# Patient Record
Sex: MALE | Race: WHITE | NOT HISPANIC OR LATINO | URBAN - METROPOLITAN AREA
[De-identification: names, ages, dates, MRNs, and addresses within clinical notes are randomized per-mention and may not be internally consistent; named-entity substitution may affect disease eponyms.]

---

## 2021-12-08 ENCOUNTER — EMERGENCY (EMERGENCY)
Facility: HOSPITAL | Age: 36
LOS: 0 days | Discharge: HOME | End: 2021-12-08
Attending: EMERGENCY MEDICINE | Admitting: EMERGENCY MEDICINE
Payer: MEDICAID

## 2021-12-08 VITALS
WEIGHT: 164.91 LBS | OXYGEN SATURATION: 100 % | SYSTOLIC BLOOD PRESSURE: 141 MMHG | DIASTOLIC BLOOD PRESSURE: 59 MMHG | TEMPERATURE: 99 F | RESPIRATION RATE: 18 BRPM | HEART RATE: 89 BPM

## 2021-12-08 DIAGNOSIS — L73.1 PSEUDOFOLLICULITIS BARBAE: ICD-10-CM

## 2021-12-08 DIAGNOSIS — F17.200 NICOTINE DEPENDENCE, UNSPECIFIED, UNCOMPLICATED: ICD-10-CM

## 2021-12-08 PROCEDURE — 99282 EMERGENCY DEPT VISIT SF MDM: CPT

## 2021-12-08 NOTE — ED PROVIDER NOTE - OBJECTIVE STATEMENT
37 yo M, no known hx here for assessment of red, swollen spot to pubic area -- notes he has had this previous and "popped it" but was concerned that he had herpes or genital warts so came to ED.    No fever, chills, pain, dysuria, discharge, other genital lesions.    No new recent sexual partners.

## 2021-12-08 NOTE — ED PROVIDER NOTE - NSFOLLOWUPINSTRUCTIONS_ED_ALL_ED_FT
Use warm compresses to the area and apply over the counter bacitracin. Try not to "pop" the small pimple over the area as it can cause worsening infection.    If you develop fever, worsening pain, redness, swelling or any other concerning conditions, please return to the ER immediately.       Ingrown Hair       An ingrown hair is a hair that curls and re-enters the skin instead of growing straight out of the skin. An ingrown hair can develop in any part of the skin that hair is removed from. An ingrown hair may cause small pockets of infection.      What are the causes?  An ingrown hair may be caused by:  •Shaving.      •Tweezing.      •Waxing.      •Using a hair removal cream.        What increases the risk?    You are more likely to develop this condition if you have curly hair.      What are the signs or symptoms?  Symptoms of an ingrown hair may include:  •Small bumps on the skin. The bumps may be filled with pus.      •Pain.      •Itching.        How is this diagnosed?    An ingrown hair is diagnosed by a skin exam.      How is this treated?  Treatment is often not needed unless the ingrown hair has caused an infection. If needed, treatment may include:  •Applying prescription creams to the skin. This can help with inflammation.      •Applying warm compresses to the skin. This can help soften the skin.      •Taking antibiotic medicine. An antibiotic may be prescribed if the infection is severe.      •Retracting and releasing the ingrown hair tips.        Follow these instructions at home:     Medicines     •Take, apply, or use over-the-counter and prescription medicines only as told by your health care provider. This includes any prescription creams.      •If you were prescribed an antibiotic medicine, take it as told by your health care provider. Do not stop using the antibiotic even if you start to feel better.      General instructions     • Do not shave irritated areas of skin. You may start shaving these areas again once the irritation has gone away.      •To help remove ingrown hairs on your face, you may use a facial sponge in a gentle circular motion.      • Do not pick or squeeze the irritated area of skin as this may cause infection and scarring.      Managing pain and swelling   •If directed, apply heat to the affected area as often as told by your health care provider. Use the heat source that your health care provider recommends, such as a moist heat pack or a heating pad.  •Place a towel between your skin and the heat source.      •Leave the heat on for 20–30 minutes.      •Remove the heat if your skin turns bright red. This is especially important if you are unable to feel pain, heat, or cold. You may have a greater risk of getting burned.          How is this prevented?    •Shower before shaving.      •Wrap areas that you are going to shave in warm, moist wraps for several minutes before shaving. The warmth and moisture help to soften the hairs and makes ingrown hairs less likely.      •Use thick shaving gels.      •Use a razor that cuts hair slightly above your skin, or use an electric shaver with a long shave setting.      •Shave in the direction of hair growth.      •Avoid making multiple razor strokes.      •Apply a moisturizing lotion after shaving.        Summary    •An ingrown hair is a hair that curls and re-enters the skin instead of growing straight out of the skin.      •Treatment is often not needed unless the ingrown hair has caused an infection.      •Take, apply, or use over-the-counter and prescription medicines only as told by your health care provider. This includes any prescription creams.      • Do not shave irritated areas of skin. You may start shaving these areas again once the irritation has gone away.      •If directed, apply heat to the affected area. Use the heat source that your health care provider recommends, such as a moist heat pack or a heating pad.      This information is not intended to replace advice given to you by your health care provider. Make sure you discuss any questions you have with your health care provider.

## 2021-12-08 NOTE — ED PROVIDER NOTE - PATIENT PORTAL LINK FT
You can access the FollowMyHealth Patient Portal offered by [facility] by registering at the following website: [email or web address]. By joining [employer]’s FollowMyHealth portal, you will also be able to view your health information using other applications (apps) compatible with our system.

## 2021-12-08 NOTE — ED ADULT NURSE NOTE - OBJECTIVE STATEMENT
36 year old male complaining of lump on right side of groin. Pt describes the lump as hard and moveable. Pt denies pain but states the lump is getting bigger. Pt states he noticed the lump about a week ago. Pt denies fever, urinary symptoms, penile discharge. Pt is unsure if STD.

## 2021-12-08 NOTE — ED PROVIDER NOTE - NS ED ROS FT
Constitutional: see HPI  Cardiac: No chest pain, SOB or edema.  Respiratory: No cough or respiratory distress  GI: No nausea, vomiting, diarrhea or abdominal pain.  : see HPI  MS: no pain to back or extremities, no loss of ROM, no weakness  Neuro: No headache or weakness. No LOC.  Skin: see HPU  Except as documented in the HPI, all other systems are negative.

## 2021-12-08 NOTE — ED PROVIDER NOTE - PHYSICAL EXAMINATION
VITAL SIGNS: I have reviewed nursing notes and confirm.  CONSTITUTIONAL: Well-developed; well-nourished; in no acute distress.  SKIN: single .5cm in diameter papule to pubic are, no fluctuance, no purulence, not herpetic in nature, slightly red and warm, non tender.   HEAD: Normocephalic; atraumatic.  EYES: PERRL, EOM intact; conjunctiva and sclera clear.  ENT: No nasal discharge; airway clear. TMs clear.  CARD: RRR, no murmur  RESP: No wheezes, rales or rhonchi.  ABD: Normal bowel sounds; soft; non-distended; non-tender  : aside from as noted in skin, no lesions, no discharge, no inguinal adenopathy.   EXT: Normal ROM.   NEURO: Alert, oriented. Grossly unremarkable. No focal deficits.  PSYCH: Cooperative, appropriate.

## 2021-12-08 NOTE — ED ADULT TRIAGE NOTE - CHIEF COMPLAINT QUOTE
pt c/o "lump in right groin and growing" describes it as hard and moveable ,denies pain. denies dysuria. wants as STD check.

## 2021-12-08 NOTE — ED PROVIDER NOTE - CLINICAL SUMMARY MEDICAL DECISION MAKING FREE TEXT BOX
Exam consistent with ingrown hair -- no signs of abscess, cellulitis. No signs of HSV, genital warts,  GC/chlamydia.     Advised on warm soaks, bacitracin, follow up.

## 2021-12-29 ENCOUNTER — EMERGENCY (EMERGENCY)
Facility: HOSPITAL | Age: 36
LOS: 1 days | End: 2021-12-29
Attending: EMERGENCY MEDICINE
Payer: MEDICAID

## 2021-12-29 PROCEDURE — L9981: CPT

## 2025-08-27 ENCOUNTER — OUTPATIENT (OUTPATIENT)
Dept: OUTPATIENT SERVICES | Facility: HOSPITAL | Age: 40
LOS: 1 days | End: 2025-08-27
Payer: COMMERCIAL

## 2025-08-27 DIAGNOSIS — K02.9 DENTAL CARIES, UNSPECIFIED: ICD-10-CM

## 2025-08-27 PROBLEM — Z78.9 OTHER SPECIFIED HEALTH STATUS: Chronic | Status: ACTIVE | Noted: 2024-08-26

## 2025-08-27 PROCEDURE — D0220: CPT

## 2025-08-27 PROCEDURE — D0140: CPT

## 2025-09-03 ENCOUNTER — OUTPATIENT (OUTPATIENT)
Dept: OUTPATIENT SERVICES | Facility: HOSPITAL | Age: 40
LOS: 1 days | End: 2025-09-03

## 2025-09-03 DIAGNOSIS — K01.1 IMPACTED TEETH: ICD-10-CM

## 2025-09-03 DIAGNOSIS — K02.9 DENTAL CARIES, UNSPECIFIED: ICD-10-CM
